# Patient Record
(demographics unavailable — no encounter records)

---

## 2024-10-15 NOTE — PHYSICAL EXAM
[General Appearance - In No Acute Distress] : in no acute distress [PERRL With Normal Accommodation] : pupils were equal in size, round, and reactive to light [Examination Of The Oral Cavity] : the lips and gums were normal [Oropharynx] : the oropharynx was normal [] : the neck was supple [Respiration, Rhythm And Depth] : normal respiratory rhythm and effort [Auscultation Breath Sounds / Voice Sounds] : lungs were clear to auscultation bilaterally [Chest Palpation] : palpation of the chest revealed no abnormalities [Heart Rate And Rhythm] : heart rate was normal and rhythm regular [Heart Sounds] : normal S1 and S2 [Murmurs] : no murmurs [Edema] : there was no peripheral edema [Bowel Sounds] : normal bowel sounds [Abdomen Soft] : soft [Abdomen Tenderness] : non-tender [No Spinal Tenderness] : no spinal tenderness [FreeTextEntry1] : BL 2-5 MCP tenderness no swelling no other joint tenderness or swelling

## 2024-10-15 NOTE — HISTORY OF PRESENT ILLNESS
[FreeTextEntry1] : CC : recent pleural effusion and + DURGA   PMD/ID  Dr Jolly at [facility] [place] since 2016   HPI: 37 y old F with PMH Asthma using as needed albuterol,  + seasonal allergies,  + HIV Dx 2016 on HAART has been stable no opportunistic infections , VL remained undetectable with stable CD4, Biktarvy since 2023 no complications with it, last physical 8/24  went to [facility] ED 8/6/24 with L sided chest pain found to have pleural effusion with CT chest , viral panel was negative no URI symptoms prior to that  has children in school and they had URI infection  evaluated by Dr Vidhya Scanlon with workup showed   8/19/24 + DURGA 1:160 speckled , negative DS DNA , SSA and SSB, mildly positive RNP 1.1 , SCL 70 ,  C3 and C4 not decreased, negative CCP and RF   Cardiac CT 8/5/24  no PE, RV/LV ratio normal, normal pulmonary arteries, normal heart size no pericardial effusion, no thoracic lymphadenopathy no mediastinal mass, small left pleural effusion    + bl hand small joint pain worse with activity, occasional morning stiffness less then 10 minutes , since age of 16, also Chronic lower back pain worse with activity occasional back stiffness up to 20 minutes  no photosensitive rash, no sicca symptoms,  no oral ulcers, no uveitis or scleritis, no prior chest pain or persistent cough , no increased hair loss, no ryanoid, no abd pain no trouble swallowing , no blood in urine or stool , rest of the review system negative  has 4 children  deliveries without complications, one miscarriage first pregnancy was first trimester   ALL NKDA SH no toxic habits   FH : no known autoimmune disease

## 2025-03-25 NOTE — PROCEDURE
[Thoracic Ultrasound] : Thoracic Ultrasound [A line] : A line: Yes [Pleural Effusion] : Pleural Effusion: No [FreeTextEntry2] : Bilateral A line pattern with no effusion present.

## 2025-03-25 NOTE — HISTORY OF PRESENT ILLNESS
[Never] : never [TextBox_4] : 37-year-old presenting with complaint of stabbing L sided chest pain. She presented to ER for this after 3 days of pain on 8/5. Work up at that time found small L sided pleural effusion. She denies smoking hx and no family hx of cancer. Denies weight loss or recent illness. However, she does have multiple children who come home from . She has no symptoms but does notice that she has some pain when she lies on that L side. She was given naproxen which has helped the pain. She denies joint pain or autoimmune disease.  9/5/2024 Patient feeling much improved with no cough or shortness of breath. Pain has resolved. She does state that she notices pain in the PIP joints in the hands improved with movement as well as knees and ankles at times. She has remote family hx of lupus.  03/24/25 Patient was feeling well until mid feb when she started to notice return of her asthma symptoms. States that she got a viral illness and since then she has noticed wheezing and coughing with various irritants. [ESS] : 0

## 2025-03-25 NOTE — ASSESSMENT
[FreeTextEntry1] : 37 year old found to have small L effusion with pleuritic chest pain  Data reviewed: ED work up report CT chest 8/5/2024- Normal parenchyma with small effusion RNP + DURGA +  Asthma L pleural effusion RNP + Joint pain  Ultrasound in office continues to show resolution of pleural effusion. She is now having return of her asthma symptoms. Will plan to start her on LABA/ICS therapy with Symbicort BID. Advised that she can also use it in an as needed form. Will plan for return to clinic in 6 weeks with PFT.  - Effusion nearly resolved on ultrasound today - LABA/ICS with Symbicort BID and PRN for asthma symptoms - PFT on follow up  RTC in 6 weeks